# Patient Record
Sex: MALE | Race: WHITE | URBAN - METROPOLITAN AREA
[De-identification: names, ages, dates, MRNs, and addresses within clinical notes are randomized per-mention and may not be internally consistent; named-entity substitution may affect disease eponyms.]

---

## 2022-09-21 ENCOUNTER — OFFICE VISIT (OUTPATIENT)
Dept: URBAN - METROPOLITAN AREA CLINIC 19 | Facility: CLINIC | Age: 82
End: 2022-09-21
Payer: MEDICARE

## 2022-09-21 ENCOUNTER — DASHBOARD ENCOUNTERS (OUTPATIENT)
Age: 82
End: 2022-09-21

## 2022-09-21 ENCOUNTER — WEB ENCOUNTER (OUTPATIENT)
Dept: URBAN - METROPOLITAN AREA CLINIC 19 | Facility: CLINIC | Age: 82
End: 2022-09-21

## 2022-09-21 ENCOUNTER — LAB OUTSIDE AN ENCOUNTER (OUTPATIENT)
Dept: URBAN - METROPOLITAN AREA CLINIC 19 | Facility: CLINIC | Age: 82
End: 2022-09-21

## 2022-09-21 VITALS
HEIGHT: 72 IN | BODY MASS INDEX: 26.28 KG/M2 | WEIGHT: 194 LBS | SYSTOLIC BLOOD PRESSURE: 132 MMHG | HEART RATE: 61 BPM | TEMPERATURE: 98 F | DIASTOLIC BLOOD PRESSURE: 78 MMHG

## 2022-09-21 DIAGNOSIS — R13.19 ESOPHAGEAL DYSPHAGIA: ICD-10-CM

## 2022-09-21 DIAGNOSIS — K20.80 OTHER ESOPHAGITIS WITHOUT BLEEDING: ICD-10-CM

## 2022-09-21 DIAGNOSIS — T14.90XA TRAUMA: ICD-10-CM

## 2022-09-21 DIAGNOSIS — J93.9 PNEUMOTHORAX, LEFT: ICD-10-CM
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
## 2022-09-21 DIAGNOSIS — T50.905A ADVERSE EFFECT OF UNSPECIFIED DRUGS, MEDICAMENTS AND BIOLOGICAL SUBSTANCES, INITIAL ENCOUNTER: ICD-10-CM

## 2022-09-21 DIAGNOSIS — R19.8 GLOBUS SENSATION: ICD-10-CM

## 2022-09-21 PROBLEM — 417746004: Status: ACTIVE | Noted: 2022-09-21

## 2022-09-21 PROBLEM — 267103008: Status: ACTIVE | Noted: 2022-09-21

## 2022-09-21 PROBLEM — 217150002: Status: ACTIVE | Noted: 2022-09-21

## 2022-09-21 PROBLEM — 109364007: Status: ACTIVE | Noted: 2022-09-21

## 2022-09-21 PROBLEM — 40890009: Status: ACTIVE | Noted: 2022-09-21

## 2022-09-21 PROCEDURE — 99204 OFFICE O/P NEW MOD 45 MIN: CPT | Performed by: STUDENT IN AN ORGANIZED HEALTH CARE EDUCATION/TRAINING PROGRAM

## 2022-09-21 RX ORDER — OMEPRAZOLE 40 MG/1
1 CAPSULE 30 MINUTES BEFORE MORNING MEAL - OPEN CAPSULE AND MIX GRANULES WITH APPLE SAUCE CAPSULE, DELAYED RELEASE ORAL ONCE A DAY
Refills: 1 | OUTPATIENT
Start: 2022-09-21

## 2022-09-21 RX ORDER — TAMSULOSIN HYDROCHLORIDE 0.4 MG/1
1 CAPSULE CAPSULE ORAL ONCE A DAY
Status: ACTIVE | COMMUNITY

## 2022-09-21 RX ORDER — OMEPRAZOLE 20 MG/1
1 CAPSULE 30 MINUTES BEFORE MORNING MEAL CAPSULE, DELAYED RELEASE ORAL ONCE A DAY
Status: ACTIVE | COMMUNITY

## 2022-09-21 RX ORDER — OMEPRAZOLE 20 MG/1
1 CAPSULE 30 MINUTES BEFORE MORNING MEAL CAPSULE, DELAYED RELEASE ORAL ONCE A DAY
OUTPATIENT

## 2022-09-21 RX ORDER — PROMETHAZINE HYDROCHLORIDE 25 MG/1
1 TABLET AS NEEDED TABLET ORAL
Status: ACTIVE | COMMUNITY

## 2022-09-21 RX ORDER — HYOSCYAMINE SULFATE 0.12 MG/1
1 TABLET AS NEEDED TABLET ORAL
Status: DISCONTINUED | COMMUNITY

## 2022-09-21 RX ORDER — GABAPENTIN 600 MG/1
1 TABLET TABLET, FILM COATED ORAL ONCE A DAY
Status: ACTIVE | COMMUNITY

## 2022-09-21 RX ORDER — SERTRALINE 25 MG/1
1 TABLET TABLET, FILM COATED ORAL ONCE A DAY
Status: ACTIVE | COMMUNITY

## 2022-09-21 NOTE — HPI-TODAY'S VISIT:
The pt is a 80 yo  M who presents for symptoms of dysphagia and trouble swallowing.  Recently had a fall from a tractor and was admitted to the ICU at [facility] betweent 8/20/2022-8/24/2022.  Noted to have a left PTX and was put on a chest tube.  Discharged to rehab facility but chose to go home and back on his regular medications.  He just started pulmonary rehab as of yesterday.  Has been seeing Dr. Jason Kranke, pulmonologist for his lung issues.  Sx onset and duration:  Trouble started when he tried swallow all of his medication medications all at once a few weeks ago since he got home.  Associated with severe pain and discomfort when the pills got lodged in his esophagus.  Symptoms lasted for several days when he felt severe pain and had trouble eating.  Symptoms have since resolved.  He called his primary care doctor, Dr. Sheth who directed him to us to get a GI evaluation.  Patient reports no prior history of dysphagia.  No prior strokes.  No known family history of dysphagia, esophageal dilations, strictures, stenosis, cancers.  Had a remote history of upper endoscopy with peptic ulcer disease from what I can mares, diagnosed after an upper endoscopy done by Dr. Zambrano more than a decade ago.  His last colonoscopy was also several years ago, also done by Dr. Zambrano.  No known family history of colon cancers.  Other family hx:  Bladder cancer with mets in father.  Brother with lymphoma  Other concerns today: None

## 2022-09-29 ENCOUNTER — TELEPHONE ENCOUNTER (OUTPATIENT)
Dept: URBAN - METROPOLITAN AREA CLINIC 19 | Facility: CLINIC | Age: 82
End: 2022-09-29